# Patient Record
Sex: FEMALE | Race: OTHER | URBAN - METROPOLITAN AREA
[De-identification: names, ages, dates, MRNs, and addresses within clinical notes are randomized per-mention and may not be internally consistent; named-entity substitution may affect disease eponyms.]

---

## 2020-08-24 ENCOUNTER — HOSPITAL ENCOUNTER (OUTPATIENT)
Dept: SLEEP MEDICINE | Age: 40
Discharge: HOME | End: 2020-08-24

## 2020-08-24 PROCEDURE — 95810 POLYSOM 6/> YRS 4/> PARAM: CPT

## 2021-01-06 PROBLEM — G47.00 PERSISTENT DISORDER OF INITIATING OR MAINTAINING SLEEP: Status: ACTIVE | Noted: 2021-01-06

## 2021-01-06 PROBLEM — R06.83 SNORING: Status: ACTIVE | Noted: 2021-01-06

## 2022-02-04 PROBLEM — G47.00 PERSISTENT DISORDER OF INITIATING OR MAINTAINING SLEEP: Chronic | Status: ACTIVE | Noted: 2021-01-06

## 2022-03-19 PROBLEM — G47.00 PERSISTENT DISORDER OF INITIATING OR MAINTAINING SLEEP: Status: ACTIVE | Noted: 2021-01-06

## 2022-03-19 PROBLEM — R06.83 SNORING: Status: ACTIVE | Noted: 2021-01-06

## 2023-01-05 ENCOUNTER — HOSPITAL ENCOUNTER (EMERGENCY)
Age: 43
Discharge: HOME | End: 2023-01-05
Attending: EMERGENCY MEDICINE

## 2023-01-05 ENCOUNTER — HOSPITAL ENCOUNTER (EMERGENCY)
Dept: GENERAL RADIOLOGY | Age: 43
End: 2023-01-05

## 2023-01-05 VITALS
OXYGEN SATURATION: 99 % | RESPIRATION RATE: 16 BRPM | HEART RATE: 85 BPM | HEIGHT: 66 IN | WEIGHT: 167 LBS | SYSTOLIC BLOOD PRESSURE: 111 MMHG | TEMPERATURE: 98 F | DIASTOLIC BLOOD PRESSURE: 74 MMHG | BODY MASS INDEX: 26.84 KG/M2

## 2023-01-05 DIAGNOSIS — S80.02XA CONTUSION OF LEFT KNEE, INITIAL ENCOUNTER: Primary | ICD-10-CM

## 2023-01-05 PROCEDURE — 99283 EMERGENCY DEPT VISIT LOW MDM: CPT

## 2023-01-05 PROCEDURE — 73562 X-RAY EXAM OF KNEE 3: CPT

## 2023-01-05 RX ORDER — MELOXICAM 15 MG/1
15 TABLET ORAL DAILY
Qty: 90 TABLET | Refills: 1 | Status: SHIPPED | OUTPATIENT
Start: 2023-01-05

## 2023-01-05 ASSESSMENT — ENCOUNTER SYMPTOMS
ABDOMINAL PAIN: 0
COUGH: 0
NAUSEA: 0
SORE THROAT: 0
BACK PAIN: 0
SHORTNESS OF BREATH: 0
VOMITING: 0

## 2023-01-05 ASSESSMENT — PAIN - FUNCTIONAL ASSESSMENT: PAIN_FUNCTIONAL_ASSESSMENT: 0-10

## 2023-01-05 ASSESSMENT — PAIN SCALES - GENERAL
PAINLEVEL_OUTOF10: 10
PAINLEVEL_OUTOF10: 5

## 2023-01-05 ASSESSMENT — PAIN DESCRIPTION - LOCATION: LOCATION: KNEE

## 2023-01-05 ASSESSMENT — PAIN DESCRIPTION - ORIENTATION: ORIENTATION: LEFT

## 2023-01-05 NOTE — DISCHARGE INSTRUCTIONS
Take all the medications as they are prescribed. If you do not have any improvement after 2 to 3 weeks, you need to follow-up with orthopedics. The information has been attached to your discharge paperwork.

## 2023-01-05 NOTE — PROGRESS NOTES
Patient speaks Amharic as their preferred language for their healthcare communication. If there are technical problems using the [employer] mobil unit, please contact Language Services for interpretation at:    Senior Kristi -Navigator ([phone])  General phone: [phone] ( [phone])  Email: [email or web address]    Please always document the use of interpreting services (name and/or 's ID number) in your clinical notes. Our interpreters are available for team members working with limited  English proficient (LEP) patients remotely, in person (if needed for special cases), as phone or video interpreters on the [employer] units.         Thank you,        Maria Isabel TREVINO  Senior /Navigator

## 2023-01-05 NOTE — ED NOTES
I have reviewed discharge instructions with the patient. The patient verbalized understanding. Patient left ED via Discharge Method: ambulatory to Home with (family). Opportunity for questions and clarification provided. Patient given 1 scripts. To continue your aftercare when you leave the hospital, you may receive an automated call from our care team to check in on how you are doing. This is a free service and part of our promise to provide the best care and service to meet your aftercare needs.  If you have questions, or wish to unsubscribe from this service please call [phone]. Thank you for Choosing our [facility] Emergency Department.        Padmini Srivastava RN  01/05/23 3711

## 2023-01-05 NOTE — ED PROVIDER NOTES
Emergency Department Provider Note                   PCP:                NIKOLE Haynes NP               Age: 38 y.o. Sex: female       ICD-10-CM    1. Contusion of left knee, initial encounter  S80. 02XA           DISPOSITION Decision To Discharge 2023 12:30:12 PM        Ann Crane is a 38 y.o. female who presents to the Emergency Department with chief complaint of    Chief Complaint   Patient presents with    Fall      57-year-old female presented to this department with left knee pain. Jessie Libel down 2 steps overnight. Directly on flexed knee. Pain worse with weightbearing,, extension. She states she cannot fully extend it. History was taken with the assistance of an . The history is provided by the patient. No  was used. Review of Systems   Constitutional:  Negative for chills and fever. HENT:  Negative for congestion and sore throat. Respiratory:  Negative for cough and shortness of breath. Cardiovascular:  Negative for chest pain and palpitations. Gastrointestinal:  Negative for abdominal pain, nausea and vomiting. Genitourinary:  Negative for dysuria and vaginal discharge. Musculoskeletal:  Positive for arthralgias and joint swelling. Negative for back pain. Skin:  Negative for wound. Psychiatric/Behavioral:  Negative for agitation. All other systems reviewed and are negative.     Past Medical History:   Diagnosis Date    Migraines         Past Surgical History:   Procedure Laterality Date    APPENDECTOMY          Family History   Problem Relation Age of Onset    Diabetes Sister     Elevated Lipids Father         Social History     Socioeconomic History    Marital status: Single   Tobacco Use    Smoking status: Former     Types: Cigarettes     Quit date: 2016     Years since quittin.6    Smokeless tobacco: Never   Substance and Sexual Activity    Alcohol use: Never    Drug use: Never         Vancomycin Previous Medications    BUTALBITAL-ACETAMINOPHEN-CAFFEINE (FIORICET, ESGIC) -40 MG PER TABLET    TAKE 1 TO 2 TABLETS BY MOUTH EVERY 6 HOURS AS NEEDED UP TO 6 PER 24 HOURS    CHOLECALCIFEROL 50 MCG (2000 UT) CAPS    Take 2,000 Units by mouth 2 times daily    LORAZEPAM (ATIVAN) 2 MG TABLET    Take 30 min to 1 hour prior to bedtime Indications: anxious    MELATONIN 5 MG TABS TABLET    Take 10 mg by mouth    MIRTAZAPINE (REMERON) 30 MG TABLET    Take by mouth    NAPROXEN (NAPROSYN) 500 MG TABLET    TAKE 1 TABLET BY MOUTH TWICE A DAY FOR 5 DAYS DURING TIME OF MENSTRUAL MIGRAINES    ZOLPIDEM (AMBIEN CR) 12.5 MG EXTENDED RELEASE TABLET    Take 12.5 mg by mouth. Vitals signs and nursing note reviewed. Patient Vitals for the past 4 hrs:   Temp Pulse Resp BP SpO2   01/05/23 1053 97.4 °F (36.3 °C) 90 16 109/70 99 %          Physical Exam  Vitals and nursing note reviewed. Constitutional:       General: She is not in acute distress. Appearance: Normal appearance. She is not ill-appearing, toxic-appearing or diaphoretic. HENT:      Head: Normocephalic and atraumatic. Nose: Nose normal.      Mouth/Throat:      Mouth: Mucous membranes are moist.   Eyes:      Pupils: Pupils are equal, round, and reactive to light. Cardiovascular:      Rate and Rhythm: Normal rate. Pulmonary:      Effort: Pulmonary effort is normal. No respiratory distress. Abdominal:      General: Abdomen is flat. Palpations: Abdomen is soft. Tenderness: There is no abdominal tenderness. Musculoskeletal:         General: Normal range of motion. Cervical back: Normal range of motion. No rigidity. Comments: Left knee with mild joint effusion, no patellar tenderness on palpation. Pain seems to be exhibited on the medial and lateral side of the knee, just inferior to the knee cap. Skin:     General: Skin is warm. Neurological:      General: No focal deficit present. Mental Status: She is alert. Psychiatric:         Mood and Affect: Mood normal.        Procedures    Medical Decision Making  Presented with left knee pain. Normal knee x-ray. I did discuss with patient, possibly necessitating an MRI however this would be most beneficial on an outpatient basis after conservative treatment was attempted. We will also provide her with orthopedic follow-up. No indication for any urinalysis, ultrasound studies, blood work however these were considered. Amount and/or Complexity of Data Reviewed  Independent Historian: parent  Radiology: ordered and independent interpretation performed. Details: Mild joint effusion       Complexity of Problem: 1 self limited or minor problem. (2)  I have conducted an independent ordering and review of X-rays. I have reviewed records from an external source: provider visit notes from PCP. Considerations: Shared decision making was utilized in the care of this patient. Patient was discharged risks and benefits of hospitalization were discussed. Orders Placed This Encounter   Procedures    XR KNEE LEFT (3 VIEWS)        Medications - No data to display    New Prescriptions    No medications on file        Results for orders placed or performed during the hospital encounter of 01/05/23   XR KNEE LEFT (3 VIEWS)    Narrative    Exam: XR KNEE LEFT (3 VIEWS) on 1/5/2023 11:21 AM    Clinical History: The Female patient is 38years old  presenting for knee pain  following fall. Comparison:  none    Findings:    3 views of the left knee were obtained. No fracture or dislocation is identified. Joint spaces are well-maintained. Impression    1. No acute osseous or joint abnormalities. XR KNEE LEFT (3 VIEWS)   Final Result      1. No acute osseous or joint abnormalities. Voice dictation software was used during the making of this note.   This software is not perfect and grammatical and other typographical errors may be present. This note has not been completely proofread for errors.      BEAU Dennis  01/05/23 3064

## 2023-01-05 NOTE — ED TRIAGE NOTES
Patient with fall last night around midnight from half way down stairs from second floor. Patient denies any loss of consciousness during incident, complains of left knee pain and swelling.

## 2023-01-16 ENCOUNTER — OFFICE VISIT (OUTPATIENT)
Dept: ORTHOPEDIC SURGERY | Age: 43
End: 2023-01-16

## 2023-01-16 DIAGNOSIS — S83.512A RUPTURE OF ANTERIOR CRUCIATE LIGAMENT OF LEFT KNEE, INITIAL ENCOUNTER: ICD-10-CM

## 2023-01-16 DIAGNOSIS — M25.562 LEFT KNEE PAIN, UNSPECIFIED CHRONICITY: Primary | ICD-10-CM

## 2023-01-16 PROCEDURE — 99204 OFFICE O/P NEW MOD 45 MIN: CPT | Performed by: PHYSICIAN ASSISTANT

## 2023-01-16 RX ORDER — HYDROCORTISONE ACETATE, PRAMOXINE HCL 2.5; 1 G/100G; G/100G
CREAM TOPICAL 3 TIMES DAILY PRN
COMMUNITY
Start: 2017-04-11

## 2023-01-16 RX ORDER — HYDROCORTISONE ACETATE 25 MG/1
25 SUPPOSITORY RECTAL 2 TIMES DAILY PRN
COMMUNITY
Start: 2017-04-11

## 2023-01-16 RX ORDER — IBUPROFEN 600 MG/1
600 TABLET ORAL EVERY 6 HOURS PRN
COMMUNITY
Start: 2017-04-30

## 2023-01-16 RX ORDER — LORAZEPAM 0.5 MG/1
TABLET ORAL
COMMUNITY
Start: 2022-11-12

## 2023-01-16 RX ORDER — MIRTAZAPINE 15 MG/1
TABLET, FILM COATED ORAL
COMMUNITY
Start: 2022-12-19

## 2023-01-16 RX ORDER — HYDROXYZINE HYDROCHLORIDE 25 MG/1
TABLET, FILM COATED ORAL
COMMUNITY
Start: 2022-12-18

## 2023-01-16 NOTE — PROGRESS NOTES
Name: Qian Pierce  YOB: 1980  Gender: female  MRN: [id number]    CC:   Chief Complaint   Patient presents with    Knee Pain     Left knee pain   , Left activity related knee pain, swelling and instability    HPI: This patient presents with an acute history of Left knee pain. It began one week ago when she fell down some stairs. She went to the emergency department. The pain interferes with activities of daily living. There is a feeling of instability. There is swelling and stiffness. Noes difficulty weightbearing    Allergies   Allergen Reactions    Vancomycin Hives     Other reaction(s): Hives/Swelling-Allergy     Past Medical History:   Diagnosis Date    Migraines      Past Surgical History:   Procedure Laterality Date    APPENDECTOMY       Family History   Problem Relation Age of Onset    Diabetes Sister     Elevated Lipids Father      Social History     Socioeconomic History    Marital status: Single     Spouse name: Not on file    Number of children: Not on file    Years of education: Not on file    Highest education level: Not on file   Occupational History    Not on file   Tobacco Use    Smoking status: Former     Types: Cigarettes     Quit date: 2016     Years since quittin.7    Smokeless tobacco: Never   Substance and Sexual Activity    Alcohol use: Never    Drug use: Never    Sexual activity: Not on file   Other Topics Concern    Not on file   Social History Narrative    Not on file     Social Determinants of Health     Financial Resource Strain: Not on file   Food Insecurity: Not on file   Transportation Needs: Not on file   Physical Activity: Not on file   Stress: Not on file   Social Connections: Not on file   Intimate Partner Violence: Not on file   Housing Stability: Not on file        No flowsheet data found. Review of Systems  Also noted on the patient medical history form in the chart and are reviewed today.  Pertinent positives and negatives are addressed with the patient, particularly those related to musculoskeletal concerns. Non-orthopaedic concerns were referred back to the primary care physician. PE:  General appearance is that of a healthy patient, alert and oriented, in no distress. Neck shows no significant abnormalities. Back and bilateral hips show no significant abnormalities with good ROM and no referral to LE with movement. No tenderness to bilateral hips. R and L upper extremities show no significant abnormalities. Both calves are soft. Dorsalis pedis pulses are 2+ and symmetrical.    Motor and sensory exam is intact and equal in both feet. KNEE Left (involved)  Right   Skin Intact    Atrophy None None   Effusion 1+ Negative   ROM  5-60 Full   Strength Quad fires  No weakness   Palpation TTP medial and lateral joint lines Non Tender throughout   Patellar Tracking Normal: J sign Neg. Crepitus 1+ None noted   Patellar Apprehension Negative Negative   Lachman's Test Positive laxity Negative   Pivot Shift Guarded Negative   Post Drawer Negative Negative   Varus  @ 0 and 30deg Negative Negative   Valgus @ 0 and 30deg Guarded Negative   Gait Crutches      X-rays:   AP, lateral,and oblique of the Left knee obtained 1/5/2 and reviewed in the office today show no evidence of arthritis, fracture, dislocation, loose body or other abnormality. Impression: Left Knee normal    Assessment:     ICD-10-CM    1. Left knee pain, unspecified chronicity  M25.562 MRI KNEE LEFT WO CONTRAST      2. Rupture of anterior cruciate ligament of left knee, initial encounter  S83.512A MRI KNEE LEFT WO CONTRAST          Plan:  I had a long discussion with the patient regarding the natural history of the problem, as well as treatment options. I gave them information about the injury. I am concerned for ligamentous laxity. I discussed with the patient obtaining MRI in order to further evaluate.   She is guarded on today's exam.  We discussed trying to get the leg extended and to not rest a pillow just under the knee. We will see her back after imaging. Given the chronicity and severity of the patient's symptoms, we will obtain an MRI. We will see them back after the scan and make a determination regarding further treatment. If there is a tear or other problem, they may require surgery. If negative, would recommend NSAID's, PT, or injection. They are amenable to this treatment plan. No follow-ups on file.       Norah Titus  01/16/23

## 2023-01-25 ENCOUNTER — OFFICE VISIT (OUTPATIENT)
Dept: ORTHOPEDIC SURGERY | Age: 43
End: 2023-01-25

## 2023-01-25 DIAGNOSIS — M25.562 LEFT KNEE PAIN, UNSPECIFIED CHRONICITY: Primary | ICD-10-CM

## 2023-01-25 DIAGNOSIS — S83.512D RUPTURE OF ANTERIOR CRUCIATE LIGAMENT OF LEFT KNEE, SUBSEQUENT ENCOUNTER: ICD-10-CM

## 2023-01-25 DIAGNOSIS — S83.242A ACUTE MEDIAL MENISCUS TEAR, LEFT, INITIAL ENCOUNTER: ICD-10-CM

## 2023-01-25 NOTE — PROGRESS NOTES
Name: Mahendra Segundo  YOB: 1980  Gender: female  MRN: [id number]    CC:   Chief Complaint   Patient presents with    Follow-up     MRI results left knee      HPI: Patient presents for MRI FU as a referral from my PA of the left knee. Recall from initial visit: This patient presents with an acute history of Left knee pain. It began one week ago when she fell down some stairs. She went to the emergency department. The pain interferes with activities of daily living. There is a feeling of instability. There is swelling and stiffness. Notes difficulty weightbearing. She is not currently working. She does not really participate in many activities other than taking care of her kids. She used to go to the gym but does not do this much. No cutting sports.     Allergies   Allergen Reactions    Vancomycin Hives     Other reaction(s): Hives/Swelling-Allergy     Past Medical History:   Diagnosis Date    Migraines      Past Surgical History:   Procedure Laterality Date    APPENDECTOMY       Family History   Problem Relation Age of Onset    Diabetes Sister     Elevated Lipids Father      Social History     Socioeconomic History    Marital status: Single     Spouse name: Not on file    Number of children: Not on file    Years of education: Not on file    Highest education level: Not on file   Occupational History    Not on file   Tobacco Use    Smoking status: Former     Types: Cigarettes     Quit date: 2016     Years since quittin.7    Smokeless tobacco: Never   Substance and Sexual Activity    Alcohol use: Never    Drug use: Never    Sexual activity: Not on file   Other Topics Concern    Not on file   Social History Narrative    Not on file     Social Determinants of Health     Financial Resource Strain: Not on file   Food Insecurity: Not on file   Transportation Needs: Not on file   Physical Activity: Not on file   Stress: Not on file   Social Connections: Not on file   Intimate Partner Violence: Not on file   Housing Stability: Not on file        No flowsheet data found. Review of Systems  Non-contributory    PE left knee:      KNEE Left (involved)  Right   Skin Intact     Atrophy None None   Effusion 1+ Negative   ROM  5-60 Full   Strength Quad fires  No weakness   Palpation TTP medial and lateral joint lines Non Tender throughout   Patellar Tracking Normal: J sign Neg. Crepitus 1+ None noted   Patellar Apprehension Negative Negative   Lachman's Test Positive  Negative   Pivot Shift Guarded Negative   Post Drawer Negative Negative   Varus  @ 0 and 30deg Negative Negative   Valgus @ 0 and 30deg Guarded Negative   Gait Crutches          MRI left knee from 1-19-23:  Narrative   Exam: MRI left knee without contrast.   Indication: Fall with left knee pain   Comparison: Left knee radiographs, 1/5/2023   Technique: Multiplanar multisequence imaging of the left knee without contrast.       FINDINGS:   Menisci: Blunting of the lateral meniscal posterior horn compatible with mild   free edge radial tearing. There is increased signal along the medial meniscal   posterior horn and body meniscal capsular interface. The substance of the medial   meniscus itself is favored intact. Cruciate ligaments: The PCL is intact. Complete mid substance ACL tear. Collateral ligaments: Mild periligamentous edema along the MCL which is   otherwise normal in signal. There is also. Ligament is edema along the LCL which   is otherwise normal in signal.       Tendons: The tendons including the extensor mechanism are intact. Bones: Pivot shift marrow contusion pattern involving the posterior aspects of   the medial and lateral tibial plateau as well as the femoral condyles, more   advanced laterally. No discrete fracture line. No malalignment. Articular cartilage:    Patellofemoral compartment: Intact. Medial compartment: Intact. Lateral compartment: Intact.        Miscellaneous: Small joint effusion. Linear fluid signal intensity centrally   within Hoffa's fat pad most likely represents injury to the infrapatellar plica. Impression       1. Complete ACL tear. 2. Mild free edge radial tearing of the lateral meniscal posterior horn. 3. Potential meniscal ramp lesion. 4. Grade 1 MCL and LCL injuries. 5. Pivot shift marrow contusion pattern without fracture. 6. Probable tearing of the infrapatellar plica. 7. Small joint effusion. I reviewed the MRI independently today. She does have an ACL tear with a kissing lesion consistent with this. Does have some mild free edge change on the posterior horn of the outside of her meniscus. There is an area over the lateral femoral condyle centrally that she has a small chondral lesion. Strains of MCL and LCL. She does have a lot of edema at the meniscal capsular junction posteriorly indicative of possible ramp lesion. Also some edema seen in the tibia there. A/Plan:     ICD-10-CM    1. Left knee pain, unspecified chronicity  M25.562       2. Rupture of anterior cruciate ligament of left knee, subsequent encounter  S83.512D       3. Acute medial meniscus tear, left, initial encounter  M03.203K            With the assistance of interpretation I discussed her injury in detail. She has a very complex case given her age as well as the injury findings on the MRI. Discussed potential for surgical treatment with reconstruction as well as conservative treatment with rehab and exercise. Also activity modification. We reviewed information from the main study in detail with the assistance of the  including rehabilitation x-rays. I would like her to work on these exercises and then follow-up with me in the next 2 weeks so that we can reevaluate after she has had time to also research this website as well. Broderick Lagunas. info    Return in about 2 weeks (around 2/8/2023), or At end of day.         Edyta Sol, MD  01/25/23